# Patient Record
Sex: MALE | URBAN - METROPOLITAN AREA
[De-identification: names, ages, dates, MRNs, and addresses within clinical notes are randomized per-mention and may not be internally consistent; named-entity substitution may affect disease eponyms.]

---

## 2023-02-13 ENCOUNTER — APPOINTMENT (RX ONLY)
Dept: URBAN - METROPOLITAN AREA CLINIC 41 | Facility: CLINIC | Age: 38
Setting detail: DERMATOLOGY
End: 2023-02-13

## 2023-02-13 DIAGNOSIS — B07.8 OTHER VIRAL WARTS: ICD-10-CM

## 2023-02-13 DIAGNOSIS — L40.0 PSORIASIS VULGARIS: ICD-10-CM | Status: INADEQUATELY CONTROLLED

## 2023-02-13 PROCEDURE — ? ADDITIONAL NOTES

## 2023-02-13 PROCEDURE — 17110 DESTRUCTION B9 LES UP TO 14: CPT

## 2023-02-13 PROCEDURE — ? COUNSELING

## 2023-02-13 PROCEDURE — ? COUNSELING: OTEZLA

## 2023-02-13 PROCEDURE — 99204 OFFICE O/P NEW MOD 45 MIN: CPT | Mod: 25

## 2023-02-13 PROCEDURE — ? PRESCRIPTION MEDICATION MANAGEMENT

## 2023-02-13 PROCEDURE — ? LIQUID NITROGEN

## 2023-02-13 PROCEDURE — ? ORDER TESTS

## 2023-02-13 ASSESSMENT — LOCATION SIMPLE DESCRIPTION DERM
LOCATION SIMPLE: LEFT MIDDLE FINGER
LOCATION SIMPLE: RIGHT HAND
LOCATION SIMPLE: RIGHT THIGH
LOCATION SIMPLE: SCALP
LOCATION SIMPLE: LEFT TEMPLE
LOCATION SIMPLE: RIGHT TEMPLE
LOCATION SIMPLE: LEFT 2ND TOE
LOCATION SIMPLE: LEFT THIGH

## 2023-02-13 ASSESSMENT — LOCATION DETAILED DESCRIPTION DERM
LOCATION DETAILED: LEFT INFERIOR FRONTAL SCALP
LOCATION DETAILED: RIGHT RADIAL PALM
LOCATION DETAILED: RIGHT ANTERIOR PROXIMAL THIGH
LOCATION DETAILED: LEFT 2ND TOENAIL
LOCATION DETAILED: RIGHT HYPOTHENAR EMINENCE
LOCATION DETAILED: RIGHT ULNAR PALM
LOCATION DETAILED: LEFT DISTAL DORSAL MIDDLE FINGER
LOCATION DETAILED: RIGHT MID TEMPLE
LOCATION DETAILED: LEFT MID TEMPLE
LOCATION DETAILED: LEFT SUPERIOR PARIETAL SCALP
LOCATION DETAILED: LEFT ANTERIOR PROXIMAL THIGH
LOCATION DETAILED: RIGHT INFERIOR FRONTAL SCALP

## 2023-02-13 ASSESSMENT — LOCATION ZONE DERM
LOCATION ZONE: SCALP
LOCATION ZONE: TOENAIL
LOCATION ZONE: LEG
LOCATION ZONE: FINGER
LOCATION ZONE: FACE
LOCATION ZONE: HAND

## 2023-02-13 NOTE — PROCEDURE: LIQUID NITROGEN
Show Topical Anesthesia Variable?: Yes
Spray Paint Text: The liquid nitrogen was applied to the skin utilizing a spray paint frosting technique.
Number Of Freeze-Thaw Cycles: 1 freeze-thaw cycle
Post-Care Instructions: I reviewed with the patient in detail post-care instructions. Patient is to wear sunprotection, and avoid picking at any of the treated lesions. Pt may apply Vaseline to crusted or scabbing areas.
Medical Necessity Clause: This procedure was medically necessary because the lesions that were treated were:
Render Post-Care Instructions In Note?: no
Medical Necessity Information: It is in your best interest to select a reason for this procedure from the list below. All of these items fulfill various CMS LCD requirements except the new and changing color options.
Detail Level: Detailed
Consent: The patient's consent was obtained including but not limited to risks of crusting, scabbing, blistering, scarring, darker or lighter pigmentary change, recurrence, incomplete removal and infection.

## 2023-02-13 NOTE — HPI: RASH
How Severe Is Your Rash?: moderate
Is This A New Presentation, Or A Follow-Up?: Rash
Additional History: New pt psoriasis reported by pt \\nPt have tried Calciprotiene and clobetasol \\n

## 2023-02-13 NOTE — PROCEDURE: COUNSELING
Detail Level: Simple
Patient Specific Counseling (Will Not Stick From Patient To Patient): —\\nPt notes he has some joint pain in his left knee. DK recommends pt to see a rheumatologist for this. Pt understands. DK mentions different options including topicals or phototherapy or biologics (pills or injections). DK explains to pt that the light therapy can be a safe treatment. DK also mentions otezla as an oral option. Pt explains he has never had a clear day. Pt cannot get to the office three times a week so he cannot do light therapy. Pt has tried and failed clobetasol, calciprotiene, triamcinolone, Ketoconazole 2% shampoo, protopic, elidel, head and shoulders, Neutrogena T/Gel, Neutrogena salicylic acid, tea tree oil shampoo. Pt pulled up his blood work from January 2023: CMP and CBC all wnl. Pt was given the rest of the blood work order.

## 2023-02-13 NOTE — HPI: BUMPS
Is This A New Presentation, Or A Follow-Up?: Bumps
Additional History: New pt bumps on his hands\\nPt thinks they are warts

## 2023-02-27 ENCOUNTER — RX ONLY (OUTPATIENT)
Age: 38
Setting detail: RX ONLY
End: 2023-02-27

## 2023-02-27 RX ORDER — APREMILAST 10-20-30MG
KIT ORAL
Qty: 1 | Refills: 0 | Status: ERX | COMMUNITY
Start: 2023-02-27

## 2023-02-27 RX ORDER — APREMILAST 30 MG/1
TABLET, FILM COATED ORAL
Qty: 60 | Refills: 5 | Status: ERX | COMMUNITY
Start: 2023-02-27

## 2023-04-29 ENCOUNTER — APPOINTMENT (RX ONLY)
Dept: URBAN - METROPOLITAN AREA CLINIC 41 | Facility: CLINIC | Age: 38
Setting detail: DERMATOLOGY
End: 2023-04-29

## 2023-04-29 DIAGNOSIS — L40.0 PSORIASIS VULGARIS: ICD-10-CM

## 2023-04-29 PROCEDURE — 96910 PHOTCHMTX TAR&UVB/PTRLTM&UVB: CPT

## 2023-04-29 PROCEDURE — ? PHOTOTHERAPY TREATMENT

## 2023-04-29 PROCEDURE — ? ADDITIONAL NOTES

## 2023-04-29 NOTE — PROCEDURE: PHOTOTHERAPY TREATMENT
Total Body Time: 00:15
Protocol For Uva: The patient received UVA.
Protocol For Photochemotherapy: Petrolatum And Broad Band Uvb: The patient received Photochemotherapy: Petrolatum and Broad Band UVB.
Protocol For Nbuvb: Hands/Feet: The patient received NBUVB.
Render Post-Care In The Note: no
Protocol: Photochemotherapy: Petrolatum and NBUVB
Protocol For Photochemotherapy: Triamcinolone Ointment And Nbuvb: The patient received Photochemotherapy: Triamcinolone and NBUVB (triamcinolone ointment applied to all lesions prior to phototherapy).
Protocol For Photochemotherapy For Severe Photoresponsive Dermatoses: Puva: The patient received Photochemotherapy for severe photoresponsive dermatoses: PUVA requiring at least 4 to 8 hours of care under direct physician supervision.
Protocol For Uva1: The patient received UVA1.
Protocol For Photochemotherapy: Mineral Oil And Broad Band Uvb: The patient received Photochemotherapy: Mineral Oil and Broad Band UVB.
Detail Level: Zone
Protocol For Photochemotherapy: Tar And Nbuvb (Goeckerman Treatment): The patient received Photochemotherapy: Tar and NBUVB (Goeckerman treatment).
Protocol For Photochemotherapy For Severe Photoresponsive Dermatoses: Tar And Broad Band Uvb (Goeckerman Treatment): The patient received Photochemotherapy for severe photoresponsive dermatoses: Tar and Broad Band UVB (Goeckerman treatment) requiring at least 4 to 8 hours of care under direct physician supervision.
Protocol For Bath Puva: The patient received Bath PUVA.
Protocol For Photochemotherapy For Severe Photoresponsive Dermatoses: Tar And Nbuvb (Goeckerman Treatment): The patient received Photochemotherapy for severe photoresponsive dermatoses: Tar and NBUVB (Goeckerman treatment) requiring at least 4 to 8 hours of care under direct physician supervision.
Protocol For Photochemotherapy: Petrolatum And Nbuvb: The patient received Photochemotherapy: Petrolatum and NBUVB (petrolatum applied to all lesions prior to phototherapy).
Protocol For Protocol For Photochemotherapy For Severe Photoresponsive Dermatoses: Bath Puva: The patient received Photochemotherapy for severe photoresponsive dermatoses: Bath PUVA requiring at least 4 to 8 hours of care under direct physician supervision.
Protocol For Broad Band Uvb: The patient received Broad Band UVB.
Post-Care Instructions: I reviewed with the patient in detail post-care instructions. Patient is to wear sun protection. Patients may expect sunburn like redness, discomfort and scabbing.
Protocol For Photochemotherapy: Tar And Broad Band Uvb (Goeckerman Treatment): The patient received Photochemotherapy: Tar and Broad Band UVB (Goeckerman treatment).
Treatment Number: 1
Protocol For Photochemotherapy For Severe Photoresponsive Dermatoses: Petrolatum And Nbuvb: The patient received Photochemotherapy for severe photoresponsive dermatoses: Petrolatum and NBUVB requiring at least 4 to 8 hours of care under direct physician supervision.
Protocol For Photochemotherapy For Severe Photoresponsive Dermatoses: Petrolatum And Broad Band Uvb: The patient received Photochemotherapyfor severe photoresponsive dermatoses: Petrolatum and Broad Band UVB requiring at least 4 to 8 hours of care under direct physician supervision.
Name Of Supervising Technician: NIYAH
Comments: New start LTTX pt. Pt advised to wear goggles and apply sunscreen on areas pt does not want covered.
Protocol For Puva: The patient received PUVA.
Consent: Written consent obtained.  The risks were reviewed with the patient including but not limited to: burn, pigmentary changes, pain, blistering, scabbing, redness, increased risk of skin cancers, and the remote possibility of scarring.
Protocol For Photochemotherapy: Baby Oil And Nbuvb: The patient received Photochemotherapy: Baby Oil and NBUVB (baby oil applied to all lesions prior to phototherapy).
Protocol For Photochemotherapy: Mineral Oil And Nbuvb: The patient received Photochemotherapy: Mineral Oil and NBUVB (mineral oil applied to all lesions prior to phototherapy).
Protocol For Nb Uva: The patient received NB UVA.

## 2023-05-06 ENCOUNTER — APPOINTMENT (RX ONLY)
Dept: URBAN - METROPOLITAN AREA CLINIC 41 | Facility: CLINIC | Age: 38
Setting detail: DERMATOLOGY
End: 2023-05-06

## 2023-05-06 DIAGNOSIS — L40.0 PSORIASIS VULGARIS: ICD-10-CM

## 2023-05-06 PROCEDURE — 96910 PHOTCHMTX TAR&UVB/PTRLTM&UVB: CPT

## 2023-05-06 PROCEDURE — ? PHOTOTHERAPY TREATMENT

## 2023-05-06 PROCEDURE — ? ADDITIONAL NOTES

## 2023-05-06 NOTE — PROCEDURE: PHOTOTHERAPY TREATMENT
Total Body Time: 00:30
Protocol For Uva: The patient received UVA.
Protocol For Photochemotherapy: Petrolatum And Broad Band Uvb: The patient received Photochemotherapy: Petrolatum and Broad Band UVB.
Protocol For Nbuvb: Hands/Feet: The patient received NBUVB.
Render Post-Care In The Note: no
Protocol: Photochemotherapy: Petrolatum and NBUVB
Protocol For Photochemotherapy: Triamcinolone Ointment And Nbuvb: The patient received Photochemotherapy: Triamcinolone and NBUVB (triamcinolone ointment applied to all lesions prior to phototherapy).
Protocol For Photochemotherapy For Severe Photoresponsive Dermatoses: Puva: The patient received Photochemotherapy for severe photoresponsive dermatoses: PUVA requiring at least 4 to 8 hours of care under direct physician supervision.
Protocol For Uva1: The patient received UVA1.
Protocol For Photochemotherapy: Mineral Oil And Broad Band Uvb: The patient received Photochemotherapy: Mineral Oil and Broad Band UVB.
Detail Level: Zone
Protocol For Photochemotherapy: Tar And Nbuvb (Goeckerman Treatment): The patient received Photochemotherapy: Tar and NBUVB (Goeckerman treatment).
Protocol For Photochemotherapy For Severe Photoresponsive Dermatoses: Tar And Broad Band Uvb (Goeckerman Treatment): The patient received Photochemotherapy for severe photoresponsive dermatoses: Tar and Broad Band UVB (Goeckerman treatment) requiring at least 4 to 8 hours of care under direct physician supervision.
Protocol For Bath Puva: The patient received Bath PUVA.
Protocol For Photochemotherapy For Severe Photoresponsive Dermatoses: Tar And Nbuvb (Goeckerman Treatment): The patient received Photochemotherapy for severe photoresponsive dermatoses: Tar and NBUVB (Goeckerman treatment) requiring at least 4 to 8 hours of care under direct physician supervision.
Protocol For Photochemotherapy: Petrolatum And Nbuvb: The patient received Photochemotherapy: Petrolatum and NBUVB (petrolatum applied to all lesions prior to phototherapy).
Protocol For Protocol For Photochemotherapy For Severe Photoresponsive Dermatoses: Bath Puva: The patient received Photochemotherapy for severe photoresponsive dermatoses: Bath PUVA requiring at least 4 to 8 hours of care under direct physician supervision.
Protocol For Broad Band Uvb: The patient received Broad Band UVB.
Post-Care Instructions: I reviewed with the patient in detail post-care instructions. Patient is to wear sun protection. Patients may expect sunburn like redness, discomfort and scabbing.
Protocol For Photochemotherapy: Tar And Broad Band Uvb (Goeckerman Treatment): The patient received Photochemotherapy: Tar and Broad Band UVB (Goeckerman treatment).
Treatment Number: 2
Protocol For Photochemotherapy For Severe Photoresponsive Dermatoses: Petrolatum And Nbuvb: The patient received Photochemotherapy for severe photoresponsive dermatoses: Petrolatum and NBUVB requiring at least 4 to 8 hours of care under direct physician supervision.
Protocol For Photochemotherapy For Severe Photoresponsive Dermatoses: Petrolatum And Broad Band Uvb: The patient received Photochemotherapyfor severe photoresponsive dermatoses: Petrolatum and Broad Band UVB requiring at least 4 to 8 hours of care under direct physician supervision.
Name Of Supervising Technician: DANO
Comments: Pt reports no burning or irritation. Pt advised to wear goggles and apply sunscreen on areas pt does not want covered.
Protocol For Puva: The patient received PUVA.
Consent: Written consent obtained.  The risks were reviewed with the patient including but not limited to: burn, pigmentary changes, pain, blistering, scabbing, redness, increased risk of skin cancers, and the remote possibility of scarring.
Protocol For Photochemotherapy: Baby Oil And Nbuvb: The patient received Photochemotherapy: Baby Oil and NBUVB (baby oil applied to all lesions prior to phototherapy).
Protocol For Photochemotherapy: Mineral Oil And Nbuvb: The patient received Photochemotherapy: Mineral Oil and NBUVB (mineral oil applied to all lesions prior to phototherapy).
Protocol For Nb Uva: The patient received NB UVA.

## 2023-05-10 ENCOUNTER — APPOINTMENT (RX ONLY)
Dept: URBAN - METROPOLITAN AREA CLINIC 41 | Facility: CLINIC | Age: 38
Setting detail: DERMATOLOGY
End: 2023-05-10

## 2023-05-10 ENCOUNTER — RX ONLY (OUTPATIENT)
Age: 38
Setting detail: RX ONLY
End: 2023-05-10

## 2023-05-10 DIAGNOSIS — L40.0 PSORIASIS VULGARIS: ICD-10-CM | Status: INADEQUATELY CONTROLLED

## 2023-05-10 PROCEDURE — ? PRESCRIPTION MEDICATION MANAGEMENT

## 2023-05-10 PROCEDURE — 99214 OFFICE O/P EST MOD 30 MIN: CPT

## 2023-05-10 PROCEDURE — ? ADDITIONAL NOTES

## 2023-05-10 PROCEDURE — ? COUNSELING

## 2023-05-10 PROCEDURE — ? HIGH RISK MEDICATION MONITORING

## 2023-05-10 RX ORDER — USTEKINUMAB 45 MG/.5ML
INJECTION, SOLUTION SUBCUTANEOUS
Qty: 1 | Refills: 2 | Status: ERX | COMMUNITY
Start: 2023-05-10

## 2023-05-10 RX ORDER — USTEKINUMAB 45 MG/.5ML
INJECTION, SOLUTION SUBCUTANEOUS
Qty: 2 | Refills: 0 | Status: ERX

## 2023-05-10 ASSESSMENT — LOCATION DETAILED DESCRIPTION DERM
LOCATION DETAILED: LEFT DISTAL DORSAL MIDDLE FINGER
LOCATION DETAILED: LEFT INFERIOR FRONTAL SCALP
LOCATION DETAILED: RIGHT MID TEMPLE
LOCATION DETAILED: LEFT ANTERIOR PROXIMAL THIGH
LOCATION DETAILED: LEFT MID TEMPLE
LOCATION DETAILED: LEFT 2ND TOENAIL
LOCATION DETAILED: RIGHT INFERIOR FRONTAL SCALP
LOCATION DETAILED: LEFT SUPERIOR PARIETAL SCALP
LOCATION DETAILED: RIGHT ANTERIOR PROXIMAL THIGH

## 2023-05-10 ASSESSMENT — LOCATION ZONE DERM
LOCATION ZONE: TOENAIL
LOCATION ZONE: FACE
LOCATION ZONE: LEG
LOCATION ZONE: SCALP
LOCATION ZONE: FINGER

## 2023-05-10 ASSESSMENT — LOCATION SIMPLE DESCRIPTION DERM
LOCATION SIMPLE: SCALP
LOCATION SIMPLE: RIGHT THIGH
LOCATION SIMPLE: LEFT MIDDLE FINGER
LOCATION SIMPLE: RIGHT TEMPLE
LOCATION SIMPLE: LEFT 2ND TOE
LOCATION SIMPLE: LEFT THIGH
LOCATION SIMPLE: LEFT TEMPLE

## 2023-05-10 ASSESSMENT — ITCH NUMERIC RATING SCALE: HOW SEVERE IS YOUR ITCHING?: 8

## 2023-05-10 ASSESSMENT — PGA PSORIASIS: PGA PSORIASIS 2020: MODERATE

## 2023-05-10 ASSESSMENT — BSA PSORIASIS: % BODY COVERED IN PSORIASIS: 20

## 2023-05-10 NOTE — PROCEDURE: ADDITIONAL NOTES
Additional Notes: Patient consent was obtained to proceed with the visit and recommended plan of care after discussion of all risks and benefits, including the risks of COVID-19 exposure.
Detail Level: Simple
Render Risk Assessment In Note?: no
Additional Notes: Pt has been using light tx and based on his schedule, he is not able to come in as frequently as necessary. Pt would like to begin systemic medication as recommend by insurance company.

## 2023-05-10 NOTE — PROCEDURE: COUNSELING
Detail Level: Simple
Patient Specific Counseling (Will Not Stick From Patient To Patient): DK counsels on LTTX. Pt would need to come in 2-3x a week. DK notes the side effects could be a sunburn. DK states that after 20 visits, we revisit the tx plan. DK counsels that psoriasis can come and go and will need to be managed. Pt notes it is difficult to get into the office more than once a week. DK states how this can make it difficult to go up in strength as the risk of sunburn increases.
Quality 410: Psoriasis Clinical Response To Oral Systemic Or Biologic Mediations: Psoriasis Assessment Tool Documented, Not Meeting Specified Benchmark

## 2023-05-30 ENCOUNTER — RX ONLY (OUTPATIENT)
Age: 38
Setting detail: RX ONLY
End: 2023-05-30

## 2023-05-30 RX ORDER — USTEKINUMAB 45 MG/.5ML
INJECTION, SOLUTION SUBCUTANEOUS
Qty: 1 | Refills: 2 | Status: ERX

## 2023-06-08 ENCOUNTER — APPOINTMENT (RX ONLY)
Dept: URBAN - METROPOLITAN AREA CLINIC 41 | Facility: CLINIC | Age: 38
Setting detail: DERMATOLOGY
End: 2023-06-08

## 2023-06-08 DIAGNOSIS — Z79.899 OTHER LONG TERM (CURRENT) DRUG THERAPY: ICD-10-CM | Status: INADEQUATELY CONTROLLED

## 2023-06-08 DIAGNOSIS — L40.0 PSORIASIS VULGARIS: ICD-10-CM | Status: INADEQUATELY CONTROLLED

## 2023-06-08 PROCEDURE — 96372 THER/PROPH/DIAG INJ SC/IM: CPT

## 2023-06-08 PROCEDURE — 99214 OFFICE O/P EST MOD 30 MIN: CPT | Mod: 25

## 2023-06-08 PROCEDURE — ? COUNSELING

## 2023-06-08 PROCEDURE — ? ADDITIONAL NOTES

## 2023-06-08 PROCEDURE — ? STELARA INJECTION

## 2023-06-08 PROCEDURE — ? PRESCRIPTION MEDICATION MANAGEMENT

## 2023-06-08 PROCEDURE — ? HIGH RISK MEDICATION MONITORING

## 2023-06-08 ASSESSMENT — PGA PSORIASIS: PGA PSORIASIS 2020: MODERATE

## 2023-06-08 ASSESSMENT — LOCATION ZONE DERM
LOCATION ZONE: FINGER
LOCATION ZONE: FACE
LOCATION ZONE: TOENAIL
LOCATION ZONE: SCALP
LOCATION ZONE: ARM
LOCATION ZONE: LEG

## 2023-06-08 ASSESSMENT — LOCATION SIMPLE DESCRIPTION DERM
LOCATION SIMPLE: LEFT 2ND TOE
LOCATION SIMPLE: RIGHT TEMPLE
LOCATION SIMPLE: RIGHT THIGH
LOCATION SIMPLE: LEFT SHOULDER
LOCATION SIMPLE: SCALP
LOCATION SIMPLE: LEFT THIGH
LOCATION SIMPLE: LEFT MIDDLE FINGER
LOCATION SIMPLE: LEFT TEMPLE

## 2023-06-08 ASSESSMENT — LOCATION DETAILED DESCRIPTION DERM
LOCATION DETAILED: LEFT INFERIOR FRONTAL SCALP
LOCATION DETAILED: LEFT DISTAL DORSAL MIDDLE FINGER
LOCATION DETAILED: LEFT ANTERIOR PROXIMAL THIGH
LOCATION DETAILED: LEFT MID TEMPLE
LOCATION DETAILED: LEFT ANTERIOR SHOULDER
LOCATION DETAILED: LEFT 2ND TOENAIL
LOCATION DETAILED: LEFT SUPERIOR PARIETAL SCALP
LOCATION DETAILED: RIGHT ANTERIOR PROXIMAL THIGH
LOCATION DETAILED: RIGHT INFERIOR FRONTAL SCALP
LOCATION DETAILED: RIGHT MID TEMPLE

## 2023-06-08 ASSESSMENT — ITCH NUMERIC RATING SCALE: HOW SEVERE IS YOUR ITCHING?: 8

## 2023-06-08 ASSESSMENT — BSA PSORIASIS: % BODY COVERED IN PSORIASIS: 20

## 2023-06-08 NOTE — PROCEDURE: HIGH RISK MEDICATION MONITORING
Azelaic Acid Counseling: Patient counseled that medicine may cause skin irritation and to avoid applying near the eyes.  In the event of skin irritation, the patient was advised to reduce the amount of the drug applied or use it less frequently.   The patient verbalized understanding of the proper use and possible adverse effects of azelaic acid.  All of the patient's questions and concerns were addressed. Wartpeel Pregnancy And Lactation Text: This medication is Pregnancy Category X and contraindicated in pregnancy and in women who may become pregnant. It is unknown if this medication is excreted in breast milk.

## 2023-06-08 NOTE — PROCEDURE: HIGH RISK MEDICATION MONITORING
69 Doxycycline Counseling:  Patient counseled regarding possible photosensitivity and increased risk for sunburn.  Patient instructed to avoid sunlight, if possible.  When exposed to sunlight, patients should wear protective clothing, sunglasses, and sunscreen.  The patient was instructed to call the office immediately if the following severe adverse effects occur:  hearing changes, easy bruising/bleeding, severe headache, or vision changes.  The patient verbalized understanding of the proper use and possible adverse effects of doxycycline.  All of the patient's questions and concerns were addressed.

## 2023-06-08 NOTE — PROCEDURE: COUNSELING
Detail Level: Simple
Patient Specific Counseling (Will Not Stick From Patient To Patient): -\\nDK counsels that once pt passes starting dose he will be on a 90 day regimen with the Stelara injections.\\n\\nPt asks about doing injections at home. DK counsels can self inject at home on his leg or have another person inject in the arm. DK counsels he recommends pt rto again in 1 month to practice self injection on thigh with next injection if interested.
Quality 410: Psoriasis Clinical Response To Oral Systemic Or Biologic Mediations: Psoriasis Assessment Tool Documented, Not Meeting Specified Benchmark

## 2023-06-08 NOTE — PROCEDURE: HIGH RISK MEDICATION MONITORING
oriented to person, place, time and situation Elidel Pregnancy And Lactation Text: This medication is Pregnancy Category C. It is unknown if this medication is excreted in breast milk.

## 2023-06-08 NOTE — PROCEDURE: HIGH RISK MEDICATION MONITORING
I reviewed the history & physical and examined the patient. There are no changes in the patient's condition, assessment, and plan of care.     Mirvaso Pregnancy And Lactation Text: This medication has not been assigned a Pregnancy Risk Category. It is unknown if the medication is excreted in breast milk.

## 2023-06-08 NOTE — PROCEDURE: HIGH RISK MEDICATION MONITORING
Detail Level: Generalized Prednisone Counseling:  I discussed with the patient the risks of prolonged use of prednisone including but not limited to weight gain, insomnia, osteoporosis, mood changes, diabetes, susceptibility to infection, glaucoma and high blood pressure.  In cases where prednisone use is prolonged, patients should be monitored with blood pressure checks, serum glucose levels and an eye exam.  Additionally, the patient may need to be placed on GI prophylaxis, PCP prophylaxis, and calcium and vitamin D supplementation and/or a bisphosphonate.  The patient verbalized understanding of the proper use and the possible adverse effects of prednisone.  All of the patient's questions and concerns were addressed.

## 2023-06-08 NOTE — PROCEDURE: HIGH RISK MEDICATION MONITORING
Simponi Counseling:  I discussed with the patient the risks of golimumab including but not limited to myelosuppression, immunosuppression, autoimmune hepatitis, demyelinating diseases, lymphoma, and serious infections.  The patient understands that monitoring is required including a PPD at baseline and must alert us or the primary physician if symptoms of infection or other concerning signs are noted. Negative

## 2023-07-05 ENCOUNTER — APPOINTMENT (RX ONLY)
Dept: URBAN - METROPOLITAN AREA CLINIC 41 | Facility: CLINIC | Age: 38
Setting detail: DERMATOLOGY
End: 2023-07-05

## 2023-07-05 DIAGNOSIS — Z79.899 OTHER LONG TERM (CURRENT) DRUG THERAPY: ICD-10-CM | Status: STABLE

## 2023-07-05 DIAGNOSIS — L40.0 PSORIASIS VULGARIS: ICD-10-CM | Status: STABLE

## 2023-07-05 PROCEDURE — 96372 THER/PROPH/DIAG INJ SC/IM: CPT

## 2023-07-05 PROCEDURE — ? HIGH RISK MEDICATION MONITORING

## 2023-07-05 PROCEDURE — ? STELARA INJECTION

## 2023-07-05 PROCEDURE — 99213 OFFICE O/P EST LOW 20 MIN: CPT | Mod: 25

## 2023-07-05 PROCEDURE — ? COUNSELING

## 2023-07-05 PROCEDURE — ? PRESCRIPTION MEDICATION MANAGEMENT

## 2023-07-05 PROCEDURE — ? ADDITIONAL NOTES

## 2023-07-05 ASSESSMENT — ITCH NUMERIC RATING SCALE: HOW SEVERE IS YOUR ITCHING?: 2

## 2023-07-05 ASSESSMENT — LOCATION ZONE DERM
LOCATION ZONE: SCALP
LOCATION ZONE: FINGER
LOCATION ZONE: TOENAIL
LOCATION ZONE: ARM
LOCATION ZONE: FACE
LOCATION ZONE: LEG

## 2023-07-05 ASSESSMENT — LOCATION DETAILED DESCRIPTION DERM
LOCATION DETAILED: LEFT DISTAL DORSAL MIDDLE FINGER
LOCATION DETAILED: RIGHT INFERIOR FRONTAL SCALP
LOCATION DETAILED: LEFT INFERIOR FRONTAL SCALP
LOCATION DETAILED: LEFT ANTERIOR PROXIMAL THIGH
LOCATION DETAILED: RIGHT MID TEMPLE
LOCATION DETAILED: LEFT 2ND TOENAIL
LOCATION DETAILED: RIGHT ANTERIOR PROXIMAL THIGH
LOCATION DETAILED: LEFT MID TEMPLE
LOCATION DETAILED: LEFT SUPERIOR PARIETAL SCALP
LOCATION DETAILED: RIGHT PROXIMAL LATERAL POSTERIOR UPPER ARM

## 2023-07-05 ASSESSMENT — LOCATION SIMPLE DESCRIPTION DERM
LOCATION SIMPLE: RIGHT THIGH
LOCATION SIMPLE: SCALP
LOCATION SIMPLE: RIGHT TEMPLE
LOCATION SIMPLE: LEFT 2ND TOE
LOCATION SIMPLE: LEFT TEMPLE
LOCATION SIMPLE: LEFT THIGH
LOCATION SIMPLE: LEFT MIDDLE FINGER
LOCATION SIMPLE: RIGHT UPPER ARM

## 2023-07-05 ASSESSMENT — BSA PSORIASIS: % BODY COVERED IN PSORIASIS: 10

## 2023-07-05 ASSESSMENT — PGA PSORIASIS: PGA PSORIASIS 2020: MILD

## 2023-07-05 NOTE — PROCEDURE: COUNSELING
Detail Level: Simple
Patient Specific Counseling (Will Not Stick From Patient To Patient): -\\nPt asks about doing injections at home. DK counsels can self inject at home on his leg or have another person inject in the arm. Pt was taught how to do injections at home today. Pt notes he will call for next bw order.\\n\\nPt is already doing much better
Quality 410: Psoriasis Clinical Response To Oral Systemic Or Biologic Mediations: Psoriasis Assessment Tool Documented, Not Meeting Specified Benchmark

## 2023-07-05 NOTE — PROCEDURE: HIGH RISK MEDICATION MONITORING
No adenopathy or splenomegaly. No cervical or inguinal lymphadenopathy. Quinolones Counseling:  I discussed with the patient the risks of fluoroquinolones including but not limited to GI upset, allergic reaction, drug rash, diarrhea, dizziness, photosensitivity, yeast infections, liver function test abnormalities, tendonitis/tendon rupture.

## 2023-07-05 NOTE — PROCEDURE: HIGH RISK MEDICATION MONITORING
[Left] : left knee [NL (140)] : flexion 140 degrees [NL (0)] : extension 0 degrees [] : no erythema Dutasteride Male Counseling: Dustasteride Counseling:  I discussed with the patient the risks of use of dutasteride including but not limited to decreased libido, decreased ejaculate volume, and gynecomastia. Women who can become pregnant should not handle medication.  All of the patient's questions and concerns were addressed. Dutasteride Counseling: Dustasteride Counseling:  I discussed with the patient the risks of use of dutasteride including but not limited to decreased libido, decreased ejaculate volume, and gynecomastia. Women who can become pregnant should not handle medication.  All of the patient's questions and concerns were addressed.

## 2024-02-15 ENCOUNTER — APPOINTMENT (RX ONLY)
Dept: URBAN - METROPOLITAN AREA CLINIC 41 | Facility: CLINIC | Age: 39
Setting detail: DERMATOLOGY
End: 2024-02-15

## 2024-02-15 DIAGNOSIS — Z79.899 OTHER LONG TERM (CURRENT) DRUG THERAPY: ICD-10-CM

## 2024-02-15 DIAGNOSIS — L40.0 PSORIASIS VULGARIS: ICD-10-CM | Status: STABLE

## 2024-02-15 PROCEDURE — ? HIGH RISK MEDICATION MONITORING

## 2024-02-15 PROCEDURE — ? ORDER TESTS

## 2024-02-15 PROCEDURE — 99214 OFFICE O/P EST MOD 30 MIN: CPT

## 2024-02-15 PROCEDURE — ? MDM - TREATMENT GOALS

## 2024-02-15 PROCEDURE — ? PRESCRIPTION MEDICATION MANAGEMENT

## 2024-02-15 PROCEDURE — ? PSORIATIC EPIDEMIOLOGY SCREENING TOOL (PEST)

## 2024-02-15 PROCEDURE — ? COUNSELING

## 2024-02-15 ASSESSMENT — LOCATION SIMPLE DESCRIPTION DERM
LOCATION SIMPLE: RIGHT THIGH
LOCATION SIMPLE: LEFT THIGH
LOCATION SIMPLE: SCALP
LOCATION SIMPLE: LEFT 2ND TOE
LOCATION SIMPLE: RIGHT TEMPLE
LOCATION SIMPLE: LEFT MIDDLE FINGER
LOCATION SIMPLE: LEFT TEMPLE

## 2024-02-15 ASSESSMENT — LOCATION ZONE DERM
LOCATION ZONE: FINGER
LOCATION ZONE: LEG
LOCATION ZONE: SCALP
LOCATION ZONE: TOENAIL
LOCATION ZONE: FACE

## 2024-02-15 ASSESSMENT — LOCATION DETAILED DESCRIPTION DERM
LOCATION DETAILED: LEFT 2ND TOENAIL
LOCATION DETAILED: LEFT SUPERIOR PARIETAL SCALP
LOCATION DETAILED: RIGHT ANTERIOR PROXIMAL THIGH
LOCATION DETAILED: LEFT DISTAL DORSAL MIDDLE FINGER
LOCATION DETAILED: RIGHT INFERIOR FRONTAL SCALP
LOCATION DETAILED: LEFT MID TEMPLE
LOCATION DETAILED: LEFT ANTERIOR PROXIMAL THIGH
LOCATION DETAILED: RIGHT MID TEMPLE
LOCATION DETAILED: LEFT INFERIOR FRONTAL SCALP

## 2024-02-15 ASSESSMENT — ITCH NUMERIC RATING SCALE: HOW SEVERE IS YOUR ITCHING?: 0

## 2024-02-15 ASSESSMENT — PGA PSORIASIS: PGA PSORIASIS 2020: CLEAR

## 2024-02-15 ASSESSMENT — BSA PSORIASIS: % BODY COVERED IN PSORIASIS: 0

## 2024-02-15 NOTE — PROCEDURE: HIGH RISK MEDICATION MONITORING
Acitretin Pregnancy And Lactation Text: This medication is Pregnancy Category X and should not be given to women who are pregnant or may become pregnant in the future. This medication is excreted in breast milk.
Doxycycline Pregnancy And Lactation Text: This medication is Pregnancy Category D and not consider safe during pregnancy. It is also excreted in breast milk but is considered safe for shorter treatment courses.
Itraconazole Pregnancy And Lactation Text: This medication is Pregnancy Category C and it isn't know if it is safe during pregnancy. It is also excreted in breast milk.
Birth Control Pills Pregnancy And Lactation Text: This medication should be avoided if pregnant and for the first 30 days post-partum.
Topical Ketoconazole Counseling: Patient counseled that this medication may cause skin irritation or allergic reactions.  In the event of skin irritation, the patient was advised to reduce the amount of the drug applied or use it less frequently.   The patient verbalized understanding of the proper use and possible adverse effects of ketoconazole.  All of the patient's questions and concerns were addressed.
Rifampin Counseling: I discussed with the patient the risks of rifampin including but not limited to liver damage, kidney damage, red-orange body fluids, nausea/vomiting and severe allergy.
Azithromycin Counseling:  I discussed with the patient the risks of azithromycin including but not limited to GI upset, allergic reaction, drug rash, diarrhea, and yeast infections.
Valtrex Pregnancy And Lactation Text: this medication is Pregnancy Category B and is considered safe during pregnancy. This medication is not directly found in breast milk but it's metabolite acyclovir is present.
Mirvaso Counseling: Mirvaso is a topical medication which can decrease superficial blood flow where applied. Side effects are uncommon and include stinging, redness and allergic reactions.
Hydroxyzine Counseling: Patient advised that the medication is sedating and not to drive a car after taking this medication.  Patient informed of potential adverse effects including but not limited to dry mouth, urinary retention, and blurry vision.  The patient verbalized understanding of the proper use and possible adverse effects of hydroxyzine.  All of the patient's questions and concerns were addressed.
Oral Minoxidil Pregnancy And Lactation Text: This medication should only be used when clearly needed if you are pregnant, attempting to become pregnant or breast feeding.
Minoxidil Counseling: Minoxidil is a topical medication which can increase blood flow where it is applied. It is uncertain how this medication increases hair growth. Side effects are uncommon and include stinging and allergic reactions.
Zyclara Pregnancy And Lactation Text: This medication is Pregnancy Category C. It is unknown if this medication is excreted in breast milk.
Sski Pregnancy And Lactation Text: This medication is Pregnancy Category D and isn't considered safe during pregnancy. It is excreted in breast milk.
Cyclosporine Counseling:  I discussed with the patient the risks of cyclosporine including but not limited to hypertension, gingival hyperplasia,myelosuppression, immunosuppression, liver damage, kidney damage, neurotoxicity, lymphoma, and serious infections. The patient understands that monitoring is required including baseline blood pressure, CBC, CMP, lipid panel and uric acid, and then 1-2 times monthly CMP and blood pressure.
Rhofade Pregnancy And Lactation Text: This medication has not been assigned a Pregnancy Risk Category. It is unknown if the medication is excreted in breast milk.
Drysol Pregnancy And Lactation Text: This medication is considered safe during pregnancy and breast feeding.
Olumiant Pregnancy And Lactation Text: Based on animal studies, Olumiant may cause embryo-fetal harm when administered to pregnant women.  The medication should not be used in pregnancy.  Breastfeeding is not recommended during treatment.
Simponi Counseling:  I discussed with the patient the risks of golimumab including but not limited to myelosuppression, immunosuppression, autoimmune hepatitis, demyelinating diseases, lymphoma, and serious infections.  The patient understands that monitoring is required including a PPD at baseline and must alert us or the primary physician if symptoms of infection or other concerning signs are noted.
Colchicine Pregnancy And Lactation Text: This medication is Pregnancy Category C and isn't considered safe during pregnancy. It is excreted in breast milk.
Tremfya Pregnancy And Lactation Text: The risk during pregnancy and breastfeeding is uncertain with this medication.
Benzoyl Peroxide Counseling: Patient counseled that medicine may cause skin irritation and bleach clothing.  In the event of skin irritation, the patient was advised to reduce the amount of the drug applied or use it less frequently.   The patient verbalized understanding of the proper use and possible adverse effects of benzoyl peroxide.  All of the patient's questions and concerns were addressed.
Low Dose Naltrexone Counseling- I discussed with the patient the potential risks and side effects of low dose naltrexone including but not limited to: more vivid dreams, headaches, nausea, vomiting, abdominal pain, fatigue, dizziness, and anxiety.
Sarecycline Pregnancy And Lactation Text: This medication is Pregnancy Category D and not consider safe during pregnancy. It is also excreted in breast milk.
Humira Pregnancy And Lactation Text: This medication is Pregnancy Category B and is considered safe during pregnancy. It is unknown if this medication is excreted in breast milk.
Cimzia Counseling:  I discussed with the patient the risks of Cimzia including but not limited to immunosuppression, allergic reactions and infections.  The patient understands that monitoring is required including a PPD at baseline and must alert us or the primary physician if symptoms of infection or other concerning signs are noted.
Erythromycin Counseling:  I discussed with the patient the risks of erythromycin including but not limited to GI upset, allergic reaction, drug rash, diarrhea, increase in liver enzymes, and yeast infections.
Rifampin Pregnancy And Lactation Text: This medication is Pregnancy Category C and it isn't know if it is safe during pregnancy. It is also excreted in breast milk and should not be used if you are breast feeding.
Ketoconazole Counseling:   Patient counseled regarding improving absorption with orange juice.  Adverse effects include but are not limited to breast enlargement, headache, diarrhea, nausea, upset stomach, liver function test abnormalities, taste disturbance, and stomach pain.  There is a rare possibility of liver failure that can occur when taking ketoconazole. The patient understands that monitoring of LFTs may be required, especially at baseline. The patient verbalized understanding of the proper use and possible adverse effects of ketoconazole.  All of the patient's questions and concerns were addressed.
Spironolactone Counseling: Patient advised regarding risks of diarrhea, abdominal pain, hyperkalemia, birth defects (for female patients), liver toxicity and renal toxicity. The patient may need blood work to monitor liver and kidney function and potassium levels while on therapy. The patient verbalized understanding of the proper use and possible adverse effects of spironolactone.  All of the patient's questions and concerns were addressed.
Bexarotene Counseling:  I discussed with the patient the risks of bexarotene including but not limited to hair loss, dry lips/skin/eyes, liver abnormalities, hyperlipidemia, pancreatitis, depression/suicidal ideation, photosensitivity, drug rash/allergic reactions, hypothyroidism, anemia, leukopenia, infection, cataracts, and teratogenicity.  Patient understands that they will need regular blood tests to check lipid profile, liver function tests, white blood cell count, thyroid function tests and pregnancy test if applicable.
Azithromycin Pregnancy And Lactation Text: This medication is considered safe during pregnancy and is also secreted in breast milk.
Topical Ketoconazole Pregnancy And Lactation Text: This medication is Pregnancy Category B and is considered safe during pregnancy. It is unknown if it is excreted in breast milk.
Otezla Counseling: The side effects of Otezla were discussed with the patient, including but not limited to worsening or new depression, weight loss, diarrhea, nausea, upper respiratory tract infection, and headache. Patient instructed to call the office should any adverse effect occur.  The patient verbalized understanding of the proper use and possible adverse effects of Otezla.  All the patient's questions and concerns were addressed.
Solaraze Counseling:  I discussed with the patient the risks of Solaraze including but not limited to erythema, scaling, itching, weeping, crusting, and pain.
Low Dose Naltrexone Pregnancy And Lactation Text: Naltrexone is pregnancy category C.  There have been no adequate and well-controlled studies in pregnant women.  It should be used in pregnancy only if the potential benefit justifies the potential risk to the fetus.   Limited data indicates that naltrexone is minimally excreted into breastmilk.
Elidel Counseling: Patient may experience a mild burning sensation during topical application. Elidel is not approved in children less than 2 years of age. There have been case reports of hematologic and skin malignancies in patients using topical calcineurin inhibitors although causality is questionable.
Cyclosporine Pregnancy And Lactation Text: This medication is Pregnancy Category C and it isn't know if it is safe during pregnancy. This medication is excreted in breast milk.
Use Enhanced Medication Counseling?: No
Hydroxyzine Pregnancy And Lactation Text: This medication is not safe during pregnancy and should not be taken. It is also excreted in breast milk and breast feeding isn't recommended.
Spironolactone Pregnancy And Lactation Text: This medication can cause feminization of the male fetus and should be avoided during pregnancy. The active metabolite is also found in breast milk.
Benzoyl Peroxide Pregnancy And Lactation Text: This medication is Pregnancy Category C. It is unknown if benzoyl peroxide is excreted in breast milk.
Tetracycline Counseling: Patient counseled regarding possible photosensitivity and increased risk for sunburn.  Patient instructed to avoid sunlight, if possible.  When exposed to sunlight, patients should wear protective clothing, sunglasses, and sunscreen.  The patient was instructed to call the office immediately if the following severe adverse effects occur:  hearing changes, easy bruising/bleeding, severe headache, or vision changes.  The patient verbalized understanding of the proper use and possible adverse effects of tetracycline.  All of the patient's questions and concerns were addressed. Patient understands to avoid pregnancy while on therapy due to potential birth defects.
Opioid Counseling: I discussed with the patient the potential side effects of opioids including but not limited to addiction, altered mental status, and depression. I stressed avoiding alcohol, benzodiazepines, muscle relaxants and sleep aids unless specifically okayed by a physician. The patient verbalized understanding of the proper use and possible adverse effects of opioids. All of the patient's questions and concerns were addressed. They were instructed to flush the remaining pills down the toilet if they did not need them for pain.
Rinvoq Counseling: I discussed with the patient the risks of Rinvoq therapy including but not limited to upper respiratory tract infections, shingles, cold sores, bronchitis, nausea, cough, fever, acne, and headache. Live vaccines should be avoided.  This medication has been linked to serious infections; higher rate of mortality; malignancy and lymphoproliferative disorders; major adverse cardiovascular events; thrombosis; thrombocytopenia, anemia, and neutropenia; lipid elevations; liver enzyme elevations; and gastrointestinal perforations.
Dapsone Counseling: I discussed with the patient the risks of dapsone including but not limited to hemolytic anemia, agranulocytosis, rashes, methemoglobinemia, kidney failure, peripheral neuropathy, headaches, GI upset, and liver toxicity.  Patients who start dapsone require monitoring including baseline LFTs and weekly CBCs for the first month, then every month thereafter.  The patient verbalized understanding of the proper use and possible adverse effects of dapsone.  All of the patient's questions and concerns were addressed.
Ilumya Counseling: I discussed with the patient the risks of tildrakizumab including but not limited to immunosuppression, malignancy, posterior leukoencephalopathy syndrome, and serious infections.  The patient understands that monitoring is required including a PPD at baseline and must alert us or the primary physician if symptoms of infection or other concerning signs are noted.
Xolair Counseling:  Patient informed of potential adverse effects including but not limited to fever, muscle aches, rash and allergic reactions.  The patient verbalized understanding of the proper use and possible adverse effects of Xolair.  All of the patient's questions and concerns were addressed.
Minoxidil Pregnancy And Lactation Text: This medication has not been assigned a Pregnancy Risk Category but animal studies failed to show danger with the topical medication. It is unknown if the medication is excreted in breast milk.
Cimzia Pregnancy And Lactation Text: This medication crosses the placenta but can be considered safe in certain situations. Cimzia may be excreted in breast milk.
Detail Level: Detailed
Bactrim Counseling:  I discussed with the patient the risks of sulfa antibiotics including but not limited to GI upset, allergic reaction, drug rash, diarrhea, dizziness, photosensitivity, and yeast infections.  Rarely, more serious reactions can occur including but not limited to aplastic anemia, agranulocytosis, methemoglobinemia, blood dyscrasias, liver or kidney failure, lung infiltrates or desquamative/blistering drug rashes.
Ketoconazole Pregnancy And Lactation Text: This medication is Pregnancy Category C and it isn't know if it is safe during pregnancy. It is also excreted in breast milk and breast feeding isn't recommended.
Erythromycin Pregnancy And Lactation Text: This medication is Pregnancy Category B and is considered safe during pregnancy. It is also excreted in breast milk.
Methotrexate Counseling:  Patient counseled regarding adverse effects of methotrexate including but not limited to nausea, vomiting, abnormalities in liver function tests. Patients may develop mouth sores, rash, diarrhea, and abnormalities in blood counts. The patient understands that monitoring is required including LFT's and blood counts.  There is a rare possibility of scarring of the liver and lung problems that can occur when taking methotrexate. Persistent nausea, loss of appetite, pale stools, dark urine, cough, and shortness of breath should be reported immediately. Patient advised to discontinue methotrexate treatment at least three months before attempting to become pregnant.  I discussed the need for folate supplements while taking methotrexate.  These supplements can decrease side effects during methotrexate treatment. The patient verbalized understanding of the proper use and possible adverse effects of methotrexate.  All of the patient's questions and concerns were addressed.
Topical Sulfur Applications Counseling: Topical Sulfur Counseling: Patient counseled that this medication may cause skin irritation or allergic reactions.  In the event of skin irritation, the patient was advised to reduce the amount of the drug applied or use it less frequently.   The patient verbalized understanding of the proper use and possible adverse effects of topical sulfur application.  All of the patient's questions and concerns were addressed.
Solaraze Pregnancy And Lactation Text: This medication is Pregnancy Category B and is considered safe. There is some data to suggest avoiding during the third trimester. It is unknown if this medication is excreted in breast milk.
Odomzo Pregnancy And Lactation Text: This medication is Pregnancy Category X and is absolutely contraindicated during pregnancy. It is unknown if it is excreted in breast milk.
Niacinamide Counseling: I recommended taking niacin or niacinamide, also know as vitamin B3, twice daily. Recent evidence suggests that taking vitamin B3 (500 mg twice daily) can reduce the risk of actinic keratoses and non-melanoma skin cancers. Side effects of vitamin B3 include flushing and headache.
Opzelura Counseling:  I discussed with the patient the risks of Opzelura including but not limited to nasopharngitis, bronchitis, ear infection, eosinophila, hives, diarrhea, folliculitis, tonsillitis, and rhinorrhea.  Taken orally, this medication has been linked to serious infections; higher rate of mortality; malignancy and lymphoproliferative disorders; major adverse cardiovascular events; thrombosis; thrombocytopenia, anemia, and neutropenia; and lipid elevations.
Bexarotene Pregnancy And Lactation Text: This medication is Pregnancy Category X and should not be given to women who are pregnant or may become pregnant. This medication should not be used if you are breast feeding.
Dapsone Pregnancy And Lactation Text: This medication is Pregnancy Category C and is not considered safe during pregnancy or breast feeding.
Xolair Pregnancy And Lactation Text: This medication is Pregnancy Category B and is considered safe during pregnancy. This medication is excreted in breast milk.
Rinvoq Pregnancy And Lactation Text: Based on animal studies, Rinvoq may cause embryo-fetal harm when administered to pregnant women.  The medication should not be used in pregnancy.  Breastfeeding is not recommended during treatment and for 6 days after the last dose.
Opioid Pregnancy And Lactation Text: These medications can lead to premature delivery and should be avoided during pregnancy. These medications are also present in breast milk in small amounts.
Carac Counseling:  I discussed with the patient the risks of Carac including but not limited to erythema, scaling, itching, weeping, crusting, and pain.
Otezla Pregnancy And Lactation Text: This medication is Pregnancy Category C and it isn't known if it is safe during pregnancy. It is unknown if it is excreted in breast milk.
Skyrizi Counseling: I discussed with the patient the risks of risankizumab-rzaa including but not limited to immunosuppression, and serious infections.  The patient understands that monitoring is required including a PPD at baseline and must alert us or the primary physician if symptoms of infection or other concerning signs are noted.
Cosentyx Counseling:  I discussed with the patient the risks of Cosentyx including but not limited to worsening of Crohn's disease, immunosuppression, allergic reactions and infections.  The patient understands that monitoring is required including a PPD at baseline and must alert us or the primary physician if symptoms of infection or other concerning signs are noted.
Albendazole Counseling:  I discussed with the patient the risks of albendazole including but not limited to cytopenia, kidney damage, nausea/vomiting and severe allergy.  The patient understands that this medication is being used in an off-label manner.
Topical Retinoid counseling:  Patient advised to apply a pea-sized amount only at bedtime and wait 30 minutes after washing their face before applying.  If too drying, patient may add a non-comedogenic moisturizer. The patient verbalized understanding of the proper use and possible adverse effects of retinoids.  All of the patient's questions and concerns were addressed.
Terbinafine Counseling: Patient counseling regarding adverse effects of terbinafine including but not limited to headache, diarrhea, rash, upset stomach, liver function test abnormalities, itching, taste/smell disturbance, nausea, abdominal pain, and flatulence.  There is a rare possibility of liver failure that can occur when taking terbinafine.  The patient understands that a baseline LFT and kidney function test may be required. The patient verbalized understanding of the proper use and possible adverse effects of terbinafine.  All of the patient's questions and concerns were addressed.
Methotrexate Pregnancy And Lactation Text: This medication is Pregnancy Category X and is known to cause fetal harm. This medication is excreted in breast milk.
Bactrim Pregnancy And Lactation Text: This medication is Pregnancy Category D and is known to cause fetal risk.  It is also excreted in breast milk.
Topical Sulfur Applications Pregnancy And Lactation Text: This medication is considered safe during pregnancy and breast feeding secondary to limited systemic absorption.
Metronidazole Counseling:  I discussed with the patient the risks of metronidazole including but not limited to seizures, nausea/vomiting, a metallic taste in the mouth, nausea/vomiting and severe allergy.
Isotretinoin Counseling: Patient should get monthly blood tests, not donate blood, not drive at night if vision affected, not share medication, and not undergo elective surgery for 6 months after tx completed. Side effects reviewed, pt to contact office should one occur.
Thalidomide Counseling: I discussed with the patient the risks of thalidomide including but not limited to birth defects, anxiety, weakness, chest pain, dizziness, cough and severe allergy.
Gabapentin Counseling: I discussed with the patient the risks of gabapentin including but not limited to dizziness, somnolence, fatigue and ataxia.
Niacinamide Pregnancy And Lactation Text: These medications are considered safe during pregnancy.
Opzelura Pregnancy And Lactation Text: There is insufficient data to evaluate drug-associated risk for major birth defects, miscarriage, or other adverse maternal or fetal outcomes.  There is a pregnancy registry that monitors pregnancy outcomes in pregnant persons exposed to the medication during pregnancy.  It is unknown if this medication is excreted in breast milk.  Do not breastfeed during treatment and for about 4 weeks after the last dose.
Oxybutynin Counseling:  I discussed with the patient the risks of oxybutynin including but not limited to skin rash, drowsiness, dry mouth, difficulty urinating, and blurred vision.
Azathioprine Counseling:  I discussed with the patient the risks of azathioprine including but not limited to myelosuppression, immunosuppression, hepatotoxicity, lymphoma, and infections.  The patient understands that monitoring is required including baseline LFTs, Creatinine, possible TPMP genotyping and weekly CBCs for the first month and then every 2 weeks thereafter.  The patient verbalized understanding of the proper use and possible adverse effects of azathioprine.  All of the patient's questions and concerns were addressed.
Sotyktu Counseling:  I discussed the most common side effects of Sotyktu including: common cold, sore throat, sinus infections, cold sores, canker sores, folliculitis, and acne.  I also discussed more serious side effects of Sotyktu including but not limited to: serious allergic reactions; increased risk for infections such as TB; cancers such as lymphomas; rhabdomyolysis and elevated CPK; and elevated triglycerides and liver enzymes. 
Dutasteride Male Counseling: Dustasteride Counseling:  I discussed with the patient the risks of use of dutasteride including but not limited to decreased libido, decreased ejaculate volume, and gynecomastia. Women who can become pregnant should not handle medication.  All of the patient's questions and concerns were addressed.
Carac Pregnancy And Lactation Text: This medication is Pregnancy Category X and contraindicated in pregnancy and in women who may become pregnant. It is unknown if this medication is excreted in breast milk.
Albendazole Pregnancy And Lactation Text: This medication is Pregnancy Category C and it isn't known if it is safe during pregnancy. It is also excreted in breast milk.
Infliximab Counseling:  I discussed with the patient the risks of infliximab including but not limited to myelosuppression, immunosuppression, autoimmune hepatitis, demyelinating diseases, lymphoma, and serious infections.  The patient understands that monitoring is required including a PPD at baseline and must alert us or the primary physician if symptoms of infection or other concerning signs are noted.
Eucrisa Counseling: Patient may experience a mild burning sensation during topical application. Eucrisa is not approved in children less than 3 months of age.
Metronidazole Pregnancy And Lactation Text: This medication is Pregnancy Category B and considered safe during pregnancy.  It is also excreted in breast milk.
Terbinafine Pregnancy And Lactation Text: This medication is Pregnancy Category B and is considered safe during pregnancy. It is also excreted in breast milk and breast feeding isn't recommended.
Erivedge Counseling- I discussed with the patient the risks of Erivedge including but not limited to nausea, vomiting, diarrhea, constipation, weight loss, changes in the sense of taste, decreased appetite, muscle spasms, and hair loss.  The patient verbalized understanding of the proper use and possible adverse effects of Erivedge.  All of the patient's questions and concerns were addressed.
Cephalexin Counseling: I counseled the patient regarding use of cephalexin as an antibiotic for prophylactic and/or therapeutic purposes. Cephalexin (commonly prescribed under brand name Keflex) is a cephalosporin antibiotic which is active against numerous classes of bacteria, including most skin bacteria. Side effects may include nausea, diarrhea, gastrointestinal upset, rash, hives, yeast infections, and in rare cases, hepatitis, kidney disease, seizures, fever, confusion, neurologic symptoms, and others. Patients with severe allergies to penicillin medications are cautioned that there is about a 10% incidence of cross-reactivity with cephalosporins. When possible, patients with penicillin allergies should use alternatives to cephalosporins for antibiotic therapy.
Wartpeel Counseling:  I discussed with the patient the risks of Wartpeel including but not limited to erythema, scaling, itching, weeping, crusting, and pain.
Picato Counseling:  I discussed with the patient the risks of Picato including but not limited to erythema, scaling, itching, weeping, crusting, and pain.
Azathioprine Pregnancy And Lactation Text: This medication is Pregnancy Category D and isn't considered safe during pregnancy. It is unknown if this medication is excreted in breast milk.
Fluconazole Counseling:  Patient counseled regarding adverse effects of fluconazole including but not limited to headache, diarrhea, nausea, upset stomach, liver function test abnormalities, taste disturbance, and stomach pain.  There is a rare possibility of liver failure that can occur when taking fluconazole.  The patient understands that monitoring of LFTs and kidney function test may be required, especially at baseline. The patient verbalized understanding of the proper use and possible adverse effects of fluconazole.  All of the patient's questions and concerns were addressed.
Prednisone Counseling:  I discussed with the patient the risks of prolonged use of prednisone including but not limited to weight gain, insomnia, osteoporosis, mood changes, diabetes, susceptibility to infection, glaucoma and high blood pressure.  In cases where prednisone use is prolonged, patients should be monitored with blood pressure checks, serum glucose levels and an eye exam.  Additionally, the patient may need to be placed on GI prophylaxis, PCP prophylaxis, and calcium and vitamin D supplementation and/or a bisphosphonate.  The patient verbalized understanding of the proper use and the possible adverse effects of prednisone.  All of the patient's questions and concerns were addressed.
Isotretinoin Pregnancy And Lactation Text: This medication is Pregnancy Category X and is considered extremely dangerous during pregnancy. It is unknown if it is excreted in breast milk.
Stelara Counseling:  I discussed with the patient the risks of ustekinumab including but not limited to immunosuppression, malignancy, posterior leukoencephalopathy syndrome, and serious infections.  The patient understands that monitoring is required including a PPD at baseline and must alert us or the primary physician if symptoms of infection or other concerning signs are noted.
Sotyktu Pregnancy And Lactation Text: There is insufficient data to evaluate whether or not Sotyktu is safe to use during pregnancy.   It is not known if Sotyktu passes into breast milk and whether or not it is safe to use when breastfeeding.  
Arava Counseling:  Patient counseled regarding adverse effects of Arava including but not limited to nausea, vomiting, abnormalities in liver function tests. Patients may develop mouth sores, rash, diarrhea, and abnormalities in blood counts. The patient understands that monitoring is required including LFTs and blood counts.  There is a rare possibility of scarring of the liver and lung problems that can occur when taking methotrexate. Persistent nausea, loss of appetite, pale stools, dark urine, cough, and shortness of breath should be reported immediately. Patient advised to discontinue Arava treatment and consult with a physician prior to attempting conception. The patient will have to undergo a treatment to eliminate Arava from the body prior to conception.
Calcipotriene Counseling:  I discussed with the patient the risks of calcipotriene including but not limited to erythema, scaling, itching, and irritation.
Nsaids Counseling: NSAID Counseling: I discussed with the patient that NSAIDs should be taken with food. Prolonged use of NSAIDs can result in the development of stomach ulcers.  Patient advised to stop taking NSAIDs if abdominal pain occurs.  The patient verbalized understanding of the proper use and possible adverse effects of NSAIDs.  All of the patient's questions and concerns were addressed.
Dupixent Counseling: I discussed with the patient the risks of dupilumab including but not limited to eye infection and irritation, cold sores, injection site reactions, worsening of asthma, allergic reactions and increased risk of parasitic infection.  Live vaccines should be avoided while taking dupilumab. Dupilumab will also interact with certain medications such as warfarin and cyclosporine. The patient understands that monitoring is required and they must alert us or the primary physician if symptoms of infection or other concerning signs are noted.
Minocycline Counseling: Patient advised regarding possible photosensitivity and discoloration of the teeth, skin, lips, tongue and gums.  Patient instructed to avoid sunlight, if possible.  When exposed to sunlight, patients should wear protective clothing, sunglasses, and sunscreen.  The patient was instructed to call the office immediately if the following severe adverse effects occur:  hearing changes, easy bruising/bleeding, severe headache, or vision changes.  The patient verbalized understanding of the proper use and possible adverse effects of minocycline.  All of the patient's questions and concerns were addressed.
Ivermectin Counseling:  Patient instructed to take medication on an empty stomach with a full glass of water.  Patient informed of potential adverse effects including but not limited to nausea, diarrhea, dizziness, itching, and swelling of the extremities or lymph nodes.  The patient verbalized understanding of the proper use and possible adverse effects of ivermectin.  All of the patient's questions and concerns were addressed.
Cephalexin Pregnancy And Lactation Text: This medication is Pregnancy Category B and considered safe during pregnancy.  It is also excreted in breast milk but can be used safely for shorter doses.
High Dose Vitamin A Counseling: Side effects reviewed, pt to contact office should one occur.
Tazorac Counseling:  Patient advised that medication is irritating and drying.  Patient may need to apply sparingly and wash off after an hour before eventually leaving it on overnight.  The patient verbalized understanding of the proper use and possible adverse effects of tazorac.  All of the patient's questions and concerns were addressed.
Dutasteride Pregnancy And Lactation Text: This medication is absolutely contraindicated in women, especially during pregnancy and breast feeding. Feminization of male fetuses is possible if taking while pregnant.
Nsaids Pregnancy And Lactation Text: These medications are considered safe up to 30 weeks gestation. It is excreted in breast milk.
Cimetidine Counseling:  I discussed with the patient the risks of Cimetidine including but not limited to gynecomastia, headache, diarrhea, nausea, drowsiness, arrhythmias, pancreatitis, skin rashes, psychosis, bone marrow suppression and kidney toxicity.
Hydroquinone Counseling:  Patient advised that medication may result in skin irritation, lightening (hypopigmentation), dryness, and burning.  In the event of skin irritation, the patient was advised to reduce the amount of the drug applied or use it less frequently.  Rarely, spots that are treated with hydroquinone can become darker (pseudoochronosis).  Should this occur, patient instructed to stop medication and call the office. The patient verbalized understanding of the proper use and possible adverse effects of hydroquinone.  All of the patient's questions and concerns were addressed.
Cellcept Counseling:  I discussed with the patient the risks of mycophenolate mofetil including but not limited to infection/immunosuppression, GI upset, hypokalemia, hypercholesterolemia, bone marrow suppression, lymphoproliferative disorders, malignancy, GI ulceration/bleed/perforation, colitis, interstitial lung disease, kidney failure, progressive multifocal leukoencephalopathy, and birth defects.  The patient understands that monitoring is required including a baseline creatinine and regular CBC testing. In addition, patient must alert us immediately if symptoms of infection or other concerning signs are noted.
Calcipotriene Pregnancy And Lactation Text: This medication has not been proven safe during pregnancy. It is unknown if this medication is excreted in breast milk.
Rituxan Counseling:  I discussed with the patient the risks of Rituxan infusions. Side effects can include infusion reactions, severe drug rashes including mucocutaneous reactions, reactivation of latent hepatitis and other infections and rarely progressive multifocal leukoencephalopathy.  All of the patient's questions and concerns were addressed.
Tranexamic Acid Counseling:  Patient advised of the small risk of bleeding problems with tranexamic acid. They were also instructed to call if they developed any nausea, vomiting or diarrhea. All of the patient's questions and concerns were addressed.
Glycopyrrolate Counseling:  I discussed with the patient the risks of glycopyrrolate including but not limited to skin rash, drowsiness, dry mouth, difficulty urinating, and blurred vision.
Dupixent Pregnancy And Lactation Text: This medication likely crosses the placenta but the risk for the fetus is uncertain. This medication is excreted in breast milk.
Xeljanz Counseling: I discussed with the patient the risks of Xeljanz therapy including increased risk of infection, liver issues, headache, diarrhea, or cold symptoms. Live vaccines should be avoided. They were instructed to call if they have any problems.
Clindamycin Counseling: I counseled the patient regarding use of clindamycin as an antibiotic for prophylactic and/or therapeutic purposes. Clindamycin is active against numerous classes of bacteria, including skin bacteria. Side effects may include nausea, diarrhea, gastrointestinal upset, rash, hives, yeast infections, and in rare cases, colitis.
Libtayo Counseling- I discussed with the patient the risks of Libtayo including but not limited to nausea, vomiting, diarrhea, and bone or muscle pain.  The patient verbalized understanding of the proper use and possible adverse effects of Libtayo.  All of the patient's questions and concerns were addressed.
Finasteride Male Counseling: Finasteride Counseling:  I discussed with the patient the risks of use of finasteride including but not limited to decreased libido, decreased ejaculate volume, gynecomastia, and depression. Women should not handle medication.  All of the patient's questions and concerns were addressed.
Griseofulvin Counseling:  I discussed with the patient the risks of griseofulvin including but not limited to photosensitivity, cytopenia, liver damage, nausea/vomiting and severe allergy.  The patient understands that this medication is best absorbed when taken with a fatty meal (e.g., ice cream or french fries).
Winlevi Counseling:  I discussed with the patient the risks of topical clascoterone including but not limited to erythema, scaling, itching, and stinging. Patient voiced their understanding.
Tazorac Pregnancy And Lactation Text: This medication is not safe during pregnancy. It is unknown if this medication is excreted in breast milk.
Olanzapine Counseling- I discussed with the patient the common side effects of olanzapine including but are not limited to: lack of energy, dry mouth, increased appetite, sleepiness, tremor, constipation, dizziness, changes in behavior, or restlessness.  Explained that teenagers are more likely to experience headaches, abdominal pain, pain in the arms or legs, tiredness, and sleepiness.  Serious side effects include but are not limited: increased risk of death in elderly patients who are confused, have memory loss, or dementia-related psychosis; hyperglycemia; increased cholesterol and triglycerides; and weight gain.
High Dose Vitamin A Pregnancy And Lactation Text: High dose vitamin A therapy is contraindicated during pregnancy and breast feeding.
Protopic Counseling: Patient may experience a mild burning sensation during topical application. Protopic is not approved in children less than 2 years of age. There have been case reports of hematologic and skin malignancies in patients using topical calcineurin inhibitors although causality is questionable.
Propranolol Counseling:  I discussed with the patient the risks of propranolol including but not limited to low heart rate, low blood pressure, low blood sugar, restlessness and increased cold sensitivity. They should call the office if they experience any of these side effects.
Glycopyrrolate Pregnancy And Lactation Text: This medication is Pregnancy Category B and is considered safe during pregnancy. It is unknown if it is excreted breast milk.
5-Fu Counseling: 5-Fluorouracil Counseling:  I discussed with the patient the risks of 5-fluorouracil including but not limited to erythema, scaling, itching, weeping, crusting, and pain.
Finasteride Pregnancy And Lactation Text: This medication is absolutely contraindicated during pregnancy. It is unknown if it is excreted in breast milk.
Clofazimine Counseling:  I discussed with the patient the risks of clofazimine including but not limited to skin and eye pigmentation, liver damage, nausea/vomiting, gastrointestinal bleeding and allergy.
Tranexamic Acid Pregnancy And Lactation Text: It is unknown if this medication is safe during pregnancy or breast feeding.
Cibinqo Counseling: I discussed with the patient the risks of Cibinqo therapy including but not limited to common cold, nausea, headache, cold sores, increased blood CPK levels, dizziness, UTIs, fatigue, acne, and vomitting. Live vaccines should be avoided.  This medication has been linked to serious infections; higher rate of mortality; malignancy and lymphoproliferative disorders; major adverse cardiovascular events; thrombosis; thrombocytopenia and lymphopenia; lipid elevations; and retinal detachment.
Rituxan Pregnancy And Lactation Text: This medication is Pregnancy Category C and it isn't know if it is safe during pregnancy. It is unknown if this medication is excreted in breast milk but similar antibodies are known to be excreted.
Enbrel Counseling:  I discussed with the patient the risks of etanercept including but not limited to myelosuppression, immunosuppression, autoimmune hepatitis, demyelinating diseases, lymphoma, and infections.  The patient understands that monitoring is required including a PPD at baseline and must alert us or the primary physician if symptoms of infection or other concerning signs are noted.
Taltz Counseling: I discussed with the patient the risks of ixekizumab including but not limited to immunosuppression, serious infections, worsening of inflammatory bowel disease and drug reactions.  The patient understands that monitoring is required including a PPD at baseline and must alert us or the primary physician if symptoms of infection or other concerning signs are noted.
Xeloctavioz Pregnancy And Lactation Text: This medication is Pregnancy Category D and is not considered safe during pregnancy.  The risk during breast feeding is also uncertain.
Griseofulvin Pregnancy And Lactation Text: This medication is Pregnancy Category X and is known to cause serious birth defects. It is unknown if this medication is excreted in breast milk but breast feeding should be avoided.
Clindamycin Pregnancy And Lactation Text: This medication can be used in pregnancy if certain situations. Clindamycin is also present in breast milk.
Quinolones Counseling:  I discussed with the patient the risks of fluoroquinolones including but not limited to GI upset, allergic reaction, drug rash, diarrhea, dizziness, photosensitivity, yeast infections, liver function test abnormalities, tendonitis/tendon rupture.
Libtayo Pregnancy And Lactation Text: This medication is contraindicated in pregnancy and when breast feeding.
Winlevi Pregnancy And Lactation Text: This medication is considered safe during pregnancy and breastfeeding.
Topical Clindamycin Counseling: Patient counseled that this medication may cause skin irritation or allergic reactions.  In the event of skin irritation, the patient was advised to reduce the amount of the drug applied or use it less frequently.   The patient verbalized understanding of the proper use and possible adverse effects of clindamycin.  All of the patient's questions and concerns were addressed.
Protopic Pregnancy And Lactation Text: This medication is Pregnancy Category C. It is unknown if this medication is excreted in breast milk when applied topically.
Hydroxychloroquine Counseling:  I discussed with the patient that a baseline ophthalmologic exam is needed at the start of therapy and every year thereafter while on therapy. A CBC may also be warranted for monitoring.  The side effects of this medication were discussed with the patient, including but not limited to agranulocytosis, aplastic anemia, seizures, rashes, retinopathy, and liver toxicity. Patient instructed to call the office should any adverse effect occur.  The patient verbalized understanding of the proper use and possible adverse effects of Plaquenil.  All the patient's questions and concerns were addressed.
Doxepin Counseling:  Patient advised that the medication is sedating and not to drive a car after taking this medication. Patient informed of potential adverse effects including but not limited to dry mouth, urinary retention, and blurry vision.  The patient verbalized understanding of the proper use and possible adverse effects of doxepin.  All of the patient's questions and concerns were addressed.
Cyclophosphamide Counseling:  I discussed with the patient the risks of cyclophosphamide including but not limited to hair loss, hormonal abnormalities, decreased fertility, abdominal pain, diarrhea, nausea and vomiting, bone marrow suppression and infection. The patient understands that monitoring is required while taking this medication.
Propranolol Pregnancy And Lactation Text: This medication is Pregnancy Category C and it isn't known if it is safe during pregnancy. It is excreted in breast milk.
Cibinqo Pregnancy And Lactation Text: It is unknown if this medication will adversely affect pregnancy or breast feeding.  You should not take this medication if you are currently pregnant or planning a pregnancy or while breastfeeding.
Birth Control Pills Counseling: Birth Control Pill Counseling: I discussed with the patient the potential side effects of OCPs including but not limited to increased risk of stroke, heart attack, thrombophlebitis, deep venous thrombosis, hepatic adenomas, breast changes, GI upset, headaches, and depression.  The patient verbalized understanding of the proper use and possible adverse effects of OCPs. All of the patient's questions and concerns were addressed.
Azelaic Acid Counseling: Patient counseled that medicine may cause skin irritation and to avoid applying near the eyes.  In the event of skin irritation, the patient was advised to reduce the amount of the drug applied or use it less frequently.   The patient verbalized understanding of the proper use and possible adverse effects of azelaic acid.  All of the patient's questions and concerns were addressed.
Olanzapine Pregnancy And Lactation Text: This medication is pregnancy category C.   There are no adequate and well controlled trials with olanzapine in pregnant females.  Olanzapine should be used during pregnancy only if the potential benefit justifies the potential risk to the fetus.   In a study in lactating healthy women, olanzapine was excreted in breast milk.  It is recommended that women taking olanzapine should not breast feed.
Adbry Counseling: I discussed with the patient the risks of tralokinumab including but not limited to eye infection and irritation, cold sores, injection site reactions, worsening of asthma, allergic reactions and increased risk of parasitic infection.  Live vaccines should be avoided while taking tralokinumab. The patient understands that monitoring is required and they must alert us or the primary physician if symptoms of infection or other concerning signs are noted.
Imiquimod Counseling:  I discussed with the patient the risks of imiquimod including but not limited to erythema, scaling, itching, weeping, crusting, and pain.  Patient understands that the inflammatory response to imiquimod is variable from person to person and was educated regarded proper titration schedule.  If flu-like symptoms develop, patient knows to discontinue the medication and contact us.
Siliq Counseling:  I discussed with the patient the risks of Siliq including but not limited to new or worsening depression, suicidal thoughts and behavior, immunosuppression, malignancy, posterior leukoencephalopathy syndrome, and serious infections.  The patient understands that monitoring is required including a PPD at baseline and must alert us or the primary physician if symptoms of infection or other concerning signs are noted. There is also a special program designed to monitor depression which is required with Siliq.
Zyclara Counseling:  I discussed with the patient the risks of imiquimod including but not limited to erythema, scaling, itching, weeping, crusting, and pain.  Patient understands that the inflammatory response to imiquimod is variable from person to person and was educated regarded proper titration schedule.  If flu-like symptoms develop, patient knows to discontinue the medication and contact us.
Valtrex Counseling: I discussed with the patient the risks of valacyclovir including but not limited to kidney damage, nausea, vomiting and severe allergy.  The patient understands that if the infection seems to be worsening or is not improving, they are to call.
Rhofade Counseling: Rhofade is a topical medication which can decrease superficial blood flow where applied. Side effects are uncommon and include stinging, redness and allergic reactions.
SSKI Counseling:  I discussed with the patient the risks of SSKI including but not limited to thyroid abnormalities, metallic taste, GI upset, fever, headache, acne, arthralgias, paraesthesias, lymphadenopathy, easy bleeding, arrhythmias, and allergic reaction.
Itraconazole Counseling:  I discussed with the patient the risks of itraconazole including but not limited to liver damage, nausea/vomiting, neuropathy, and severe allergy.  The patient understands that this medication is best absorbed when taken with acidic beverages such as non-diet cola or ginger ale.  The patient understands that monitoring is required including baseline LFTs and repeat LFTs at intervals.  The patient understands that they are to contact us or the primary physician if concerning signs are noted.
Cyclophosphamide Pregnancy And Lactation Text: This medication is Pregnancy Category D and it isn't considered safe during pregnancy. This medication is excreted in breast milk.
Doxycycline Counseling:  Patient counseled regarding possible photosensitivity and increased risk for sunburn.  Patient instructed to avoid sunlight, if possible.  When exposed to sunlight, patients should wear protective clothing, sunglasses, and sunscreen.  The patient was instructed to call the office immediately if the following severe adverse effects occur:  hearing changes, easy bruising/bleeding, severe headache, or vision changes.  The patient verbalized understanding of the proper use and possible adverse effects of doxycycline.  All of the patient's questions and concerns were addressed.
Odomzo Counseling- I discussed with the patient the risks of Odomzo including but not limited to nausea, vomiting, diarrhea, constipation, weight loss, changes in the sense of taste, decreased appetite, muscle spasms, and hair loss.  The patient verbalized understanding of the proper use and possible adverse effects of Odomzo.  All of the patient's questions and concerns were addressed.
Acitretin Counseling:  I discussed with the patient the risks of acitretin including but not limited to hair loss, dry lips/skin/eyes, liver damage, hyperlipidemia, depression/suicidal ideation, photosensitivity.  Serious rare side effects can include but are not limited to pancreatitis, pseudotumor cerebri, bony changes, clot formation/stroke/heart attack.  Patient understands that alcohol is contraindicated since it can result in liver toxicity and significantly prolong the elimination of the drug by many years.
Drysol Counseling:  I discussed with the patient the risks of drysol/aluminum chloride including but not limited to skin rash, itching, irritation, burning.
Doxepin Pregnancy And Lactation Text: This medication is Pregnancy Category C and it isn't known if it is safe during pregnancy. It is also excreted in breast milk and breast feeding isn't recommended.
Tremfya Counseling: I discussed with the patient the risks of guselkumab including but not limited to immunosuppression, serious infections, and drug reactions.  The patient understands that monitoring is required including a PPD at baseline and must alert us or the primary physician if symptoms of infection or other concerning signs are noted.
Hydroxychloroquine Pregnancy And Lactation Text: This medication has been shown to cause fetal harm but it isn't assigned a Pregnancy Risk Category. There are small amounts excreted in breast milk.
Colchicine Counseling:  Patient counseled regarding adverse effects including but not limited to stomach upset (nausea, vomiting, stomach pain, or diarrhea).  Patient instructed to limit alcohol consumption while taking this medication.  Colchicine may reduce blood counts especially with prolonged use.  The patient understands that monitoring of kidney function and blood counts may be required, especially at baseline. The patient verbalized understanding of the proper use and possible adverse effects of colchicine.  All of the patient's questions and concerns were addressed.
Sarecycline Counseling: Patient advised regarding possible photosensitivity and discoloration of the teeth, skin, lips, tongue and gums.  Patient instructed to avoid sunlight, if possible.  When exposed to sunlight, patients should wear protective clothing, sunglasses, and sunscreen.  The patient was instructed to call the office immediately if the following severe adverse effects occur:  hearing changes, easy bruising/bleeding, severe headache, or vision changes.  The patient verbalized understanding of the proper use and possible adverse effects of sarecycline.  All of the patient's questions and concerns were addressed.
Oral Minoxidil Counseling- I discussed with the patient the risks of oral minoxidil including but not limited to shortness of breath, swelling of the feet or ankles, dizziness, lightheadedness, unwanted hair growth and allergic reaction.  The patient verbalized understanding of the proper use and possible adverse effects of oral minoxidil.  All of the patient's questions and concerns were addressed.
Humira Counseling:  I discussed with the patient the risks of adalimumab including but not limited to myelosuppression, immunosuppression, autoimmune hepatitis, demyelinating diseases, lymphoma, and serious infections.  The patient understands that monitoring is required including a PPD at baseline and must alert us or the primary physician if symptoms of infection or other concerning signs are noted.
Olumiant Counseling: I discussed with the patient the risks of Olumiant therapy including but not limited to upper respiratory tract infections, shingles, cold sores, and nausea. Live vaccines should be avoided.  This medication has been linked to serious infections; higher rate of mortality; malignancy and lymphoproliferative disorders; major adverse cardiovascular events; thrombosis; gastrointestinal perforations; neutropenia; lymphopenia; anemia; liver enzyme elevations; and lipid elevations.
Adbry Pregnancy And Lactation Text: It is unknown if this medication will adversely affect pregnancy or breast feeding.

## 2024-02-15 NOTE — PROCEDURE: ORDER TESTS
Billing Type: Third-Party Bill
Bill For Surgical Tray: no
Expected Date Of Service: 02/15/2024
Performing Laboratory: [id number]

## 2024-02-15 NOTE — PROCEDURE: COUNSELING
Detail Level: Simple
Patient Specific Counseling (Will Not Stick From Patient To Patient): X\\nPt notes he has seen significant improvement being on the stelara.
Quality 410: Psoriasis Clinical Response To Oral Systemic Or Biologic Medications: Psoriasis Assessment Tool Documented, Not Meeting Specified Benchmark

## 2024-02-26 ENCOUNTER — RX ONLY (OUTPATIENT)
Age: 39
Setting detail: RX ONLY
End: 2024-02-26

## 2024-02-26 RX ORDER — USTEKINUMAB 45 MG/.5ML
INJECTION, SOLUTION SUBCUTANEOUS
Qty: 1 | Refills: 2 | Status: ERX

## 2024-04-08 ENCOUNTER — RX ONLY (OUTPATIENT)
Age: 39
Setting detail: RX ONLY
End: 2024-04-08

## 2024-04-08 RX ORDER — USTEKINUMAB 45 MG/.5ML
INJECTION, SOLUTION SUBCUTANEOUS
Qty: 1 | Refills: 2 | Status: ERX

## 2024-05-03 ENCOUNTER — RX ONLY (OUTPATIENT)
Age: 39
Setting detail: RX ONLY
End: 2024-05-03

## 2024-05-03 RX ORDER — USTEKINUMAB 45 MG/.5ML
INJECTION, SOLUTION SUBCUTANEOUS
Qty: 1 | Refills: 2 | Status: ERX

## 2024-09-11 ENCOUNTER — APPOINTMENT (RX ONLY)
Dept: URBAN - METROPOLITAN AREA CLINIC 41 | Facility: CLINIC | Age: 39
Setting detail: DERMATOLOGY
End: 2024-09-11

## 2024-09-11 DIAGNOSIS — Z79.899 OTHER LONG TERM (CURRENT) DRUG THERAPY: ICD-10-CM | Status: STABLE

## 2024-09-11 DIAGNOSIS — L60.3 NAIL DYSTROPHY: ICD-10-CM | Status: INADEQUATELY CONTROLLED

## 2024-09-11 DIAGNOSIS — L40.0 PSORIASIS VULGARIS: ICD-10-CM | Status: STABLE

## 2024-09-11 PROCEDURE — G2211 COMPLEX E/M VISIT ADD ON: HCPCS

## 2024-09-11 PROCEDURE — ? PRESCRIPTION

## 2024-09-11 PROCEDURE — ? STELARA MONITORING

## 2024-09-11 PROCEDURE — ? PSORIATIC EPIDEMIOLOGY SCREENING TOOL (PEST)

## 2024-09-11 PROCEDURE — ? VISIT COMPLEXITY

## 2024-09-11 PROCEDURE — ? ORDER TESTS

## 2024-09-11 PROCEDURE — ? COUNSELING

## 2024-09-11 PROCEDURE — 99214 OFFICE O/P EST MOD 30 MIN: CPT

## 2024-09-11 PROCEDURE — ? MDM - TREATMENT GOALS

## 2024-09-11 PROCEDURE — ? HIGH RISK MEDICATION MONITORING

## 2024-09-11 PROCEDURE — ? PRESCRIPTION MEDICATION MANAGEMENT

## 2024-09-11 RX ORDER — EFINACONAZOLE 100 MG/ML
SOLUTION TOPICAL
Qty: 8 | Refills: 5 | Status: ERX | COMMUNITY
Start: 2024-09-11

## 2024-09-11 RX ADMIN — EFINACONAZOLE: 100 SOLUTION TOPICAL at 00:00

## 2024-09-11 ASSESSMENT — LOCATION SIMPLE DESCRIPTION DERM
LOCATION SIMPLE: SCALP
LOCATION SIMPLE: LEFT MIDDLE FINGER
LOCATION SIMPLE: LEFT TEMPLE
LOCATION SIMPLE: LEFT THIGH
LOCATION SIMPLE: RIGHT TEMPLE
LOCATION SIMPLE: RIGHT THIGH
LOCATION SIMPLE: LEFT 2ND TOE

## 2024-09-11 ASSESSMENT — LOCATION DETAILED DESCRIPTION DERM
LOCATION DETAILED: RIGHT INFERIOR FRONTAL SCALP
LOCATION DETAILED: RIGHT MID TEMPLE
LOCATION DETAILED: LEFT INFERIOR FRONTAL SCALP
LOCATION DETAILED: LEFT DISTAL DORSAL MIDDLE FINGER
LOCATION DETAILED: LEFT SUPERIOR PARIETAL SCALP
LOCATION DETAILED: RIGHT ANTERIOR PROXIMAL THIGH
LOCATION DETAILED: LEFT 2ND TOENAIL
LOCATION DETAILED: LEFT MID TEMPLE
LOCATION DETAILED: LEFT ANTERIOR PROXIMAL THIGH

## 2024-09-11 ASSESSMENT — PGA PSORIASIS: PGA PSORIASIS 2020: CLEAR

## 2024-09-11 ASSESSMENT — ITCH NUMERIC RATING SCALE: HOW SEVERE IS YOUR ITCHING?: 0

## 2024-09-11 ASSESSMENT — LOCATION ZONE DERM
LOCATION ZONE: FINGER
LOCATION ZONE: TOENAIL
LOCATION ZONE: LEG
LOCATION ZONE: FACE
LOCATION ZONE: SCALP

## 2024-09-11 ASSESSMENT — BSA PSORIASIS: % BODY COVERED IN PSORIASIS: 0

## 2024-09-11 NOTE — PROCEDURE: ORDER TESTS
Bill For Surgical Tray: no
Billing Type: Third-Party Bill
Performing Laboratory: 0
Expected Date Of Service: 09/11/2024

## 2024-09-11 NOTE — PROCEDURE: COUNSELING
Detail Level: Simple
Patient Specific Counseling (Will Not Stick From Patient To Patient): X\\nPt notes he has seen significant improvement being on the stelara and advised to continue the medication
Quality 410: Psoriasis Clinical Response To Oral Systemic Or Biologic Medications: Psoriasis Assessment Tool Documented, Not Meeting Specified Benchmark
Detail Level: Zone

## 2024-09-11 NOTE — PROCEDURE: PRESCRIPTION MEDICATION MANAGEMENT
Detail Level: Zone
Render In Strict Bullet Format?: No
Continue Regimen: Stelara
Plan: Jublia 10% QD until nail grows out. May take up to a year to completely resolve

## 2025-03-03 ENCOUNTER — APPOINTMENT (OUTPATIENT)
Dept: URBAN - METROPOLITAN AREA CLINIC 41 | Facility: CLINIC | Age: 40
Setting detail: DERMATOLOGY
End: 2025-03-03

## 2025-03-03 DIAGNOSIS — Z79.899 OTHER LONG TERM (CURRENT) DRUG THERAPY: ICD-10-CM

## 2025-03-03 PROCEDURE — ? ORDER TESTS

## 2025-03-03 NOTE — PROCEDURE: ORDER TESTS
Lab Facility: [id number]
Performing Laboratory: [id number]
Expected Date Of Service: 03/03/2025
Billing Type: Third-Party Bill
Bill For Surgical Tray: no

## 2025-03-31 ENCOUNTER — RX ONLY (RX ONLY)
Age: 40
End: 2025-03-31

## 2025-03-31 ENCOUNTER — APPOINTMENT (OUTPATIENT)
Dept: URBAN - METROPOLITAN AREA CLINIC 41 | Facility: CLINIC | Age: 40
Setting detail: DERMATOLOGY
End: 2025-03-31

## 2025-03-31 DIAGNOSIS — L40.0 PSORIASIS VULGARIS: ICD-10-CM | Status: STABLE

## 2025-03-31 DIAGNOSIS — L60.3 NAIL DYSTROPHY: ICD-10-CM | Status: INADEQUATELY CONTROLLED

## 2025-03-31 DIAGNOSIS — Z79.899 OTHER LONG TERM (CURRENT) DRUG THERAPY: ICD-10-CM

## 2025-03-31 PROCEDURE — ? PSORIATIC EPIDEMIOLOGY SCREENING TOOL (PEST)

## 2025-03-31 PROCEDURE — ? COUNSELING

## 2025-03-31 PROCEDURE — ? MDM - TREATMENT GOALS

## 2025-03-31 PROCEDURE — ? PRESCRIPTION MEDICATION MANAGEMENT

## 2025-03-31 PROCEDURE — G2211 COMPLEX E/M VISIT ADD ON: HCPCS

## 2025-03-31 PROCEDURE — ? STELARA MONITORING

## 2025-03-31 PROCEDURE — 99214 OFFICE O/P EST MOD 30 MIN: CPT

## 2025-03-31 PROCEDURE — ? HIGH RISK MEDICATION MONITORING

## 2025-03-31 PROCEDURE — ? VISIT COMPLEXITY

## 2025-03-31 RX ORDER — USTEKINUMAB 45 MG/.5ML
INJECTION, SOLUTION SUBCUTANEOUS
Qty: 1 | Refills: 2 | Status: ERX

## 2025-03-31 ASSESSMENT — LOCATION DETAILED DESCRIPTION DERM
LOCATION DETAILED: LEFT ANTERIOR PROXIMAL THIGH
LOCATION DETAILED: LEFT DISTAL DORSAL MIDDLE FINGER
LOCATION DETAILED: RIGHT MID TEMPLE
LOCATION DETAILED: RIGHT INFERIOR FRONTAL SCALP
LOCATION DETAILED: LEFT 2ND TOENAIL
LOCATION DETAILED: LEFT SUPERIOR PARIETAL SCALP
LOCATION DETAILED: LEFT MID TEMPLE
LOCATION DETAILED: LEFT INFERIOR FRONTAL SCALP
LOCATION DETAILED: RIGHT ANTERIOR PROXIMAL THIGH

## 2025-03-31 ASSESSMENT — LOCATION ZONE DERM
LOCATION ZONE: LEG
LOCATION ZONE: SCALP
LOCATION ZONE: TOENAIL
LOCATION ZONE: FACE
LOCATION ZONE: FINGER

## 2025-03-31 ASSESSMENT — LOCATION SIMPLE DESCRIPTION DERM
LOCATION SIMPLE: LEFT 2ND TOE
LOCATION SIMPLE: RIGHT TEMPLE
LOCATION SIMPLE: RIGHT THIGH
LOCATION SIMPLE: SCALP
LOCATION SIMPLE: LEFT MIDDLE FINGER
LOCATION SIMPLE: LEFT TEMPLE
LOCATION SIMPLE: LEFT THIGH

## 2025-03-31 ASSESSMENT — PGA PSORIASIS: PGA PSORIASIS 2020: CLEAR

## 2025-03-31 ASSESSMENT — BSA PSORIASIS: % BODY COVERED IN PSORIASIS: 0

## 2025-03-31 ASSESSMENT — ITCH NUMERIC RATING SCALE: HOW SEVERE IS YOUR ITCHING?: 0

## 2025-03-31 NOTE — PROCEDURE: COUNSELING
Detail Level: Simple
Patient Specific Counseling (Will Not Stick From Patient To Patient): X\\nPts bloodwork is in and everything is within normal limits.
Quality 410: Psoriasis Clinical Response To Oral Systemic Or Biologic Medications: Psoriasis Assessment Tool Documented, Not Meeting Specified Benchmark
Detail Level: Zone
Patient Specific Counseling (Will Not Stick From Patient To Patient): X\\nNotes pt should continue with Jublia for another 6 months.

## 2025-03-31 NOTE — PROCEDURE: PRESCRIPTION MEDICATION MANAGEMENT
Detail Level: Zone
Render In Strict Bullet Format?: No
Continue Regimen: Stelara
Plan: Continue Jublia 10% QD until nail grows out. May take up to a year to completely resolve